# Patient Record
Sex: FEMALE | Race: WHITE | NOT HISPANIC OR LATINO | Employment: FULL TIME | URBAN - METROPOLITAN AREA
[De-identification: names, ages, dates, MRNs, and addresses within clinical notes are randomized per-mention and may not be internally consistent; named-entity substitution may affect disease eponyms.]

---

## 2017-12-18 ENCOUNTER — OFFICE VISIT (OUTPATIENT)
Dept: URGENT CARE | Facility: URGENT CARE | Age: 52
End: 2017-12-18
Payer: COMMERCIAL

## 2017-12-18 VITALS
RESPIRATION RATE: 18 BRPM | TEMPERATURE: 97.7 F | BODY MASS INDEX: 46 KG/M2 | HEART RATE: 68 BPM | SYSTOLIC BLOOD PRESSURE: 141 MMHG | WEIGHT: 276.4 LBS | OXYGEN SATURATION: 97 % | DIASTOLIC BLOOD PRESSURE: 79 MMHG

## 2017-12-18 DIAGNOSIS — J20.9 ACUTE BRONCHITIS, UNSPECIFIED ORGANISM: Primary | ICD-10-CM

## 2017-12-18 PROCEDURE — 99213 OFFICE O/P EST LOW 20 MIN: CPT | Performed by: PHYSICIAN ASSISTANT

## 2017-12-18 RX ORDER — AMOXICILLIN AND CLAVULANATE POTASSIUM 500; 125 MG/1; MG/1
1 TABLET, FILM COATED ORAL 2 TIMES DAILY
Qty: 20 TABLET | Refills: 0 | Status: SHIPPED | OUTPATIENT
Start: 2017-12-18

## 2017-12-18 NOTE — NURSING NOTE
"Chief Complaint   Patient presents with     Urgent Care     Pt states cold sxs 2x weeks now probably bronchitis per patient        Initial /79  Pulse 68  Temp 97.7  F (36.5  C) (Oral)  Resp 18  Wt 276 lb 6.4 oz (125.4 kg)  SpO2 97%  BMI 46 kg/m2 Estimated body mass index is 46 kg/(m^2) as calculated from the following:    Height as of 11/7/16: 5' 5\" (1.651 m).    Weight as of this encounter: 276 lb 6.4 oz (125.4 kg).  Medication Reconciliation: complete      "

## 2017-12-18 NOTE — MR AVS SNAPSHOT
After Visit Summary   12/18/2017    Flora Solano    MRN: [id number]           Patient Information     Date Of Birth          1965        Visit Information        Provider Department      12/18/2017 5:15 PM Pam Laurent PA-C [facility]        Today's Diagnoses     Acute bronchitis, unspecified organism    -  1       Follow-ups after your visit        Who to contact     If you have questions or need follow up information about today's clinic visit or your schedule please contact [facility] directly at [phone].  Normal or non-critical lab and imaging results will be communicated to you by Peppercoinhart, letter or phone within 4 business days after the clinic has received the results. If you do not hear from us within 7 days, please contact the clinic through Peppercoinhart or phone. If you have a critical or abnormal lab result, we will notify you by phone as soon as possible.  Submit refill requests through Corepair or call your pharmacy and they will forward the refill request to us. Please allow 3 business days for your refill to be completed.          Additional Information About Your Visit        MyChart Information     Corepair gives you secure access to your electronic health record. If you see a primary care provider, you can also send messages to your care team and make appointments. If you have questions, please call your primary care clinic.  If you do not have a primary care provider, please call [phone] and they will assist you.        Care EveryWhere ID     This is your Care EveryWhere ID. This could be used by other organizations to access your [place] medical records  [id number]        Your Vitals Were     Pulse Temperature Respirations Pulse Oximetry BMI (Body Mass Index)       68 97.7  F (36.5  C) (Oral) 18 97% 46 kg/m2        Blood Pressure from Last 3 Encounters:   12/18/17 141/79   11/07/16 110/64   11/07/16 120/80    Weight  from Last 3 Encounters:   12/18/17 276 lb 6.4 oz (125.4 kg)   11/07/16 280 lb 1.6 oz (127.1 kg)   08/11/16 277 lb (125.6 kg)              Today, you had the following     No orders found for display         Today's Medication Changes          These changes are accurate as of: 12/18/17  8:28 PM.  If you have any questions, ask your nurse or doctor.               Start taking these medicines.        Dose/Directions    amoxicillin-clavulanate 500-125 MG per tablet   Commonly known as:  AUGMENTIN   Used for:  Acute bronchitis, unspecified organism   Started by:  Pam Laurent PA-C        Dose:  1 tablet   Take 1 tablet by mouth 2 times daily   Quantity:  20 tablet   Refills:  0            Where to get your medicines      Some of these will need a paper prescription and others can be bought over the counter.  Ask your nurse if you have questions.     Bring a paper prescription for each of these medications     amoxicillin-clavulanate 500-125 MG per tablet                Primary Care Provider Office Phone # Fax #    Frances JOE Moreno [phone]       [facility] [place] [address]    [facility] [address]        Equal Access to Services     [facility]: Hadii rose marie ku hadasho Soomaali, waaxda luqadaha, qaybta kaalmada adeegyada, joanna lopez . So [facility] [phone].    ATENCIÓN: Si habla español, tiene a serrano disposición servicios gratuitos de asistencia lingüística. Llame al [phone].    We comply with applicable federal civil rights laws and [place] laws. We do not discriminate on the basis of race, color, national origin, age, disability, sex, sexual orientation, or gender identity.            Thank you!     Thank you for choosing [facility]  for your care. Our goal is always to provide you with excellent care. Hearing back from our patients is one way we can continue to improve our services. Please take a few minutes to complete  the written survey that you may receive in the mail after your visit with us. Thank you!             Your Updated Medication List - Protect others around you: Learn how to safely use, store and throw away your medicines at www.disposemymeds.org.          This list is accurate as of: 12/18/17  8:28 PM.  Always use your most recent med list.                   Brand Name Dispense Instructions for use Diagnosis    AMBIEN 10 MG tablet   Generic drug:  zolpidem      Take 1 tablet (10 mg) by mouth nightly as needed for sleep        amoxicillin-clavulanate 500-125 MG per tablet    AUGMENTIN    20 tablet    Take 1 tablet by mouth 2 times daily    Acute bronchitis, unspecified organism       ATIVAN 0.5 MG tablet   Generic drug:  LORazepam      Take 0.5-1 tablets (0.25-0.5 mg) by mouth every 8 hours as needed for anxiety Take 30 minutes prior to departure.  Do not operate a vehicle after taking this medication        cetirizine 10 MG tablet    zyrTEC     Take 1 tablet (10 mg) by mouth every evening        citalopram 40 MG tablet    celeXA     Take 1 tablet (40 mg) by mouth daily        naproxen sodium 550 MG tablet    ANAPROX    60 tablet    Take 1 tablet (550 mg) by mouth 2 times daily (with meals)    Acute pain of right knee       ZANTAC 150 MG tablet   Generic drug:  ranitidine      1 TAB PO BID (Twice per day)    Esophageal reflux

## 2017-12-19 NOTE — PROGRESS NOTES
Flora Solano is a 52 year old year old female who presents today for evaluation of complaints of chest congestion, cough for the past 3 weeks. States it started out with nasal congestion, sinus pressure that resolved about 2 weeks ago but the cough remained. Has tried numerous OTC medications without relief. Uses a C-PAP. Replaced the tubing because she felt the tubing was old.     Review Of Systems  Skin: negative  Eyes: negative  Ears/Nose/Throat: Some ear congestion that has caused some dizziness at times - none recently. Denies sinus pain/pressure. Denies sore throat  Respiratory: Cough- productive, yellowish  Cardiovascular: negative  Gastrointestinal: negative  Genitourinary: negative  Musculoskeletal: negative      Past Medical History:   Diagnosis Date     Allergic rhinitis      Anxiety      Depression      GERD (gastroesophageal reflux disease)      Hemorrhoids      Insomnia      Obesity      Obesity, unspecified      Tobacco use disorder      quit 2009       Past Surgical History:   Procedure Laterality Date     NO HISTORY OF SURGERY         Family History   Problem Relation Age of Onset     CEREBROVASCULAR DISEASE Maternal Grandmother      CEREBROVASCULAR DISEASE Maternal Aunt      DIABETES Maternal Grandmother      Cancer - colorectal Paternal Grandmother       age 53     CANCER Maternal Uncle      esophageal     HEART DISEASE Maternal Grandmother      murmur       Social History   Substance Use Topics     Smoking status: Former Smoker     Packs/day: 0.50     Years: 28.00     Types: Cigarettes     Quit date: 11/27/2009     Smokeless tobacco: Never Used     Alcohol use No       Drug and lactation database from the [place] [employer]:  http://toxnet.nlm.nih.gov/cgi-bin/sis/htmlgen?LACT      Exam:  Constitutional: alert and no distress  Head: negative  Neck: Neck supple. No adenopathy. Thyroid symmetric, normal size,  ENT: bilateral TM fluid noted, throat normal without erythema or  exudate and sinuses nontender  Cardiovascular: negative  Respiratory: negative      A/P:    (J20.9) Acute bronchitis, unspecified organism  (primary encounter diagnosis)    Plan: amoxicillin-clavulanate (AUGMENTIN) 500-125 MG         per tablet            Follow up with primary MD prn problems/worsening symptoms.

## 2020-02-15 ENCOUNTER — NURSE TRIAGE (OUTPATIENT)
Dept: NURSING | Facility: CLINIC | Age: 55
End: 2020-02-15

## 2020-02-15 NOTE — TELEPHONE ENCOUNTER
Calling about access to Tamiflu      Recently ill within the past 24 hrs - suspects influenza        Currently   Fever <100F     Yes chills   Yes diarreha   Yes nausea   Back pain    No coughing       Yes dizzy when trying to stand   Not get flu shot this year       Not a current pt of [facility] system      A/P:     > Would suggest in-person care - [facility] / Urgent care would be appropriate for this   > also noted [email or web address] as well    > Can stay hydrated until then - fluids   > OK for OTC pain relievers prn aches / pain    > Suggested imodium OTC as well for diarrhea until can be seen     Jose L Hobbs         Reason for Disposition    [1] Patient is NOT HIGH RISK AND [2] strongly requests antiviral medicine AND [3] flu symptoms present < 48 hours    Protocols used: INFLUENZA - SEASONAL-A-

## 2021-05-25 PROCEDURE — 99283 EMERGENCY DEPT VISIT LOW MDM: CPT | Mod: 25

## 2021-05-26 ENCOUNTER — HOSPITAL ENCOUNTER (EMERGENCY)
Facility: CLINIC | Age: 56
Discharge: HOME | End: 2021-05-26
Attending: PHYSICIAN ASSISTANT | Admitting: PHYSICIAN ASSISTANT
Payer: COMMERCIAL

## 2021-05-26 VITALS
OXYGEN SATURATION: 100 % | DIASTOLIC BLOOD PRESSURE: 75 MMHG | TEMPERATURE: 98 F | RESPIRATION RATE: 18 BRPM | HEART RATE: 66 BPM | SYSTOLIC BLOOD PRESSURE: 125 MMHG

## 2021-05-26 DIAGNOSIS — T30.4 CHEMICAL BURN: ICD-10-CM

## 2021-05-26 DIAGNOSIS — M79.5 FOREIGN BODY (FB) IN SOFT TISSUE: ICD-10-CM

## 2021-05-26 PROCEDURE — 250N000011 HC RX IP 250 OP 636: Performed by: PHYSICIAN ASSISTANT

## 2021-05-26 PROCEDURE — 90471 IMMUNIZATION ADMIN: CPT | Performed by: PHYSICIAN ASSISTANT

## 2021-05-26 PROCEDURE — 90715 TDAP VACCINE 7 YRS/> IM: CPT | Performed by: PHYSICIAN ASSISTANT

## 2021-05-26 PROCEDURE — 250N000013 HC RX MED GY IP 250 OP 250 PS 637: Performed by: PHYSICIAN ASSISTANT

## 2021-05-26 RX ORDER — MUPIROCIN 20 MG/G
OINTMENT TOPICAL 3 TIMES DAILY
Qty: 1 G | Refills: 0 | Status: SHIPPED | OUTPATIENT
Start: 2021-05-26

## 2021-05-26 RX ORDER — LIDOCAINE HYDROCHLORIDE AND EPINEPHRINE 10; 10 MG/ML; UG/ML
INJECTION, SOLUTION INFILTRATION; PERINEURAL
Status: DISCONTINUED
Start: 2021-05-26 | End: 2021-05-26 | Stop reason: HOSPADM

## 2021-05-26 RX ORDER — CEPHALEXIN 500 MG/1
500 CAPSULE ORAL 3 TIMES DAILY
Qty: 21 CAPSULE | Refills: 0 | Status: SHIPPED | OUTPATIENT
Start: 2021-05-26 | End: 2021-06-02

## 2021-05-26 RX ORDER — CEPHALEXIN 500 MG/1
500 CAPSULE ORAL ONCE
Status: COMPLETED | OUTPATIENT
Start: 2021-05-26 | End: 2021-05-26

## 2021-05-26 RX ADMIN — CEPHALEXIN 500 MG: 500 CAPSULE ORAL at 00:48

## 2021-05-26 RX ADMIN — CLOSTRIDIUM TETANI TOXOID ANTIGEN (FORMALDEHYDE INACTIVATED), CORYNEBACTERIUM DIPHTHERIAE TOXOID ANTIGEN (FORMALDEHYDE INACTIVATED), BORDETELLA PERTUSSIS TOXOID ANTIGEN (GLUTARALDEHYDE INACTIVATED), BORDETELLA PERTUSSIS FILAMENTOUS HEMAGGLUTININ ANTIGEN (FORMALDEHYDE INACTIVATED), BORDETELLA PERTUSSIS PERTACTIN ANTIGEN, AND BORDETELLA PERTUSSIS FIMBRIAE 2/3 ANTIGEN 0.5 ML: 5; 2; 2.5; 5; 3; 5 INJECTION, SUSPENSION INTRAMUSCULAR at 00:49

## 2021-05-26 ASSESSMENT — ENCOUNTER SYMPTOMS: WOUND: 1
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
## 2024-07-15 ENCOUNTER — OFFICE VISIT (OUTPATIENT)
Dept: URGENT CARE | Facility: URGENT CARE | Age: 59
End: 2024-07-15
Payer: COMMERCIAL

## 2024-07-15 VITALS
BODY MASS INDEX: 46.26 KG/M2 | OXYGEN SATURATION: 98 % | HEART RATE: 64 BPM | SYSTOLIC BLOOD PRESSURE: 136 MMHG | TEMPERATURE: 98.3 F | DIASTOLIC BLOOD PRESSURE: 82 MMHG | WEIGHT: 278 LBS

## 2024-07-15 DIAGNOSIS — H10.9 BACTERIAL CONJUNCTIVITIS: Primary | ICD-10-CM

## 2024-07-15 PROCEDURE — 99203 OFFICE O/P NEW LOW 30 MIN: CPT | Performed by: PHYSICIAN ASSISTANT

## 2024-07-15 RX ORDER — OFLOXACIN 3 MG/ML
1-2 SOLUTION/ DROPS OPHTHALMIC 4 TIMES DAILY
Qty: 10 ML | Refills: 0 | Status: SHIPPED | OUTPATIENT
Start: 2024-07-15

## 2024-07-15 ASSESSMENT — ENCOUNTER SYMPTOMS
SORE THROAT: 0
EYE DISCHARGE: 1
FEVER: 0
EYE PAIN: 1
PHOTOPHOBIA: 0
EYE ITCHING: 1
EYE REDNESS: 1

## 2024-07-15 NOTE — PROGRESS NOTES
Assessment & Plan:        ICD-10-CM    1. Bacterial conjunctivitis  H10.9 ofloxacin (OCUFLOX) 0.3 % ophthalmic solution            Plan/Clinical Decision Making:    Patient with acute conjunctivitis since yesterday with green discharge. No orbital tenderness or swelling. Erythema of conjunctiva. Antibiotic prescribed.       Return if symptoms worsen or fail to improve, for in 2-3 days.     At the end of the encounter, I discussed results, diagnosis, medications. Discussed red flags for immediate return to clinic/ER, as well as indications for follow up if no improvement. Patient understood and agreed to plan. Patient was stable for discharge.        Patricia Lerma PA-C on 7/15/2024 at 11:38 AM          Subjective:     HPI:    Flora is a 58 year old female who presents to clinic today for the following health issues:  Chief Complaint   Patient presents with    Eye Problem     Left Eye irritation x 1 day -- discharge, redness, pain  -- tried some drops and warm compress     HPI    Left eye with irritation. Yellowish/green discharge, redness. Tried some drops OTC. Warm compresses. Wears glasses, no contacts.     Review of Systems   Constitutional:  Negative for fever.   HENT:  Negative for congestion, ear discharge and sore throat.    Eyes:  Positive for pain (mild), discharge, redness and itching. Negative for photophobia and visual disturbance.         Patient Active Problem List   Diagnosis    Hemorrhoids    GERD (gastroesophageal reflux disease)    Allergic rhinitis    Anxiety    Insomnia    LISSET (obstructive sleep apnea)    Obesity    Depression    CARDIOVASCULAR SCREENING; LDL GOAL LESS THAN 160        Past Medical History:   Diagnosis Date    Allergic rhinitis     Anxiety     Depression     GERD (gastroesophageal reflux disease)     Hemorrhoids     Insomnia     Obesity     Obesity, unspecified     Tobacco use disorder      quit 2009       Social History     Tobacco Use    Smoking status: Former     Current  packs/day: 0.00     Average packs/day: 0.5 packs/day for 28.0 years (14.0 ttl pk-yrs)     Types: Cigarettes     Start date: 1981     Quit date: 2009     Years since quittin.6    Smokeless tobacco: Never   Substance Use Topics    Alcohol use: No             Objective:     Vitals:    07/15/24 1121   BP: 136/82   BP Location: Right arm   Patient Position: Chair   Cuff Size: Adult Regular   Pulse: 64   Temp: 98.3  F (36.8  C)   TempSrc: Oral   SpO2: 98%   Weight: 126.1 kg (278 lb)         Physical Exam   EXAM:   Pleasant, alert, appropriate appearance. NAD.  Head Exam: Normocephalic, atraumatic.  Eye Exam:  PERRLA, EOMI, left eye with yellow/green discharge, lid swelling, no orbital swelling or tenderness.   Neck/Thyroid Exam:  No LAD.      Results:  No results found for any visits on 07/15/24.